# Patient Record
Sex: FEMALE | Race: WHITE | NOT HISPANIC OR LATINO | URBAN - METROPOLITAN AREA
[De-identification: names, ages, dates, MRNs, and addresses within clinical notes are randomized per-mention and may not be internally consistent; named-entity substitution may affect disease eponyms.]

---

## 2022-09-01 ENCOUNTER — HOSPITAL ENCOUNTER (OUTPATIENT)
Dept: BEHAVIORAL HEALTH | Facility: CLINIC | Age: 27
Discharge: HOME | End: 2022-09-01
Attending: PSYCHIATRY & NEUROLOGY
Payer: COMMERCIAL

## 2022-09-01 PROCEDURE — 999N000216 HC STATISTIC ADULT CD FACE TO FACE-NO CHRG: Mod: GT | Performed by: COUNSELOR

## 2022-09-01 NOTE — PROGRESS NOTES
[facility]    ADULT Mental Health Assessment Appointment Note    PATIENT'S NAME:  Saray Sun    PREFERRED NAME: Marianna  MRN: [id number]  YOB: 1995  Address:  [address] [place] [id number]  PREFERRED PHONE: [phone]  May we leave a referral related message: Yes  EMAIL: [email or web address]  DATE OF SERVICE: 22  START TIME:  800  END TIME: 830  SERVICE MODALITY:  Video Visit:      Provider verified identity through the following two step process.  Patient provided:  Patient  and Patient address    Telemedicine Visit: The patient's condition can be safely assessed and treated via synchronous audio and visual telemedicine encounter.      Reason for Telemedicine Visit: Patient has requested telehealth visit    Originating Site (Patient Location): Patient's home    Distant Site (Provider Location): Provider Remote Setting- Home Office    Consent:  The patient/guardian has verbally consented to: the potential risks and benefits of telemedicine (video visit) versus in person care; bill my insurance or make self-payment for services provided; and responsibility for payment of non-covered services.     Patient would like the video invitation sent by:  My Chart    Mode of Communication:  Video Conference via [employer]    As the provider I attest to compliance with applicable laws and regulations related to telemedicine.    Identifying Information:  Patient is a 27 year old, female.  Patient was referred for an assessment by self.  Patient attended the session alone. Patient identified their preferred language to be English. Patient reported they au not need the assistance of an  or other support involved in therapy.     The patient s risk to self and others was assessed in the risks and follow up section of this document.    Chief Complaint:  The reason for seeking services at this time is: The reason for seeking  "services at this time is: \"Me\".  The problem(s) began 8/4/2022. Patient endorses a history of depression and anxiety. She reports that she has not worked in eight months due to mental health systems. She has noticed an increase in severity of symptoms within the last month following a relationship break up.  Patient resides with mom who is encouraging patient to get psychiatric help.     Substance Use:  Do you  have a history of alcohol or illicit drug use?   Patient reports that she drinks alcohol, is not concerned about her use. Reports mom is someone concerned.   A problematic pattern of alcohol/drug use leading to clinically significant impairment or distress, as manifested by at least two of the following, occurring within a 12-month period: (1) Substance is often taken in larger amounts or over a longer period than was intended. (2) There is persistent desire or unsuccessful efforts to cut down or control use of the substance.  (3) A great deal of time is spent in activities necessary to obtain the substance, use the substance, or recover from its effects. (4) Craving, or a strong desire or urge to use the substance. (5) Recurrent use of the substance resulting in a failure to fulfill major role obligations at work, school, or home. (6) Continued use of the substance despite having persistent or recurrent social or interpersonal problems caused or exacerbated by the effects of its use. (7)  Important social, occupational, or recreational activities are given up or reduced because of the substance. (8) Recurrent use of the substance in which it is physically hazardous. (9) Use of the substance is continued despite knowledge of having a persistent or recurrent physical or psychological problem that is likely to have been cause or exacerbated by the substance.(10) Tolerance:  either a need for markedly increased amounts of the substance to achieve the desired effect or a markedly diminished effect with continued " use of the dame amount of the substance.  The patient does not currently identity positively with any of the 11 DSM-5 criteria for a diagnostic impression of having a substance use disorder. If problematic use begins/returns, patient should be seen for an updated PRABHJOT assessment to determine if they meet criteria for any level of PRABHJOT programming. There are not recommendations for structured treatment or community support programming at present. Diagnostic assessment for substance use disorder completed.     Significant Losses / Trauma / Abuse / Neglect Issues:   There are indications or report of significant loss, trauma, abuse or neglect issues related to: There are indications and client denies any losses, trauma, abuse, or neglect concerns.  Concerns for possible neglect are not present.      Medical History:  Patient reports the following medication history: No past medical history on file..      Saray Dino reports taking the following medications:  No current outpatient medications on file.     No current facility-administered medications for this encounter.     Current Mental Status Exam:   Appearance:  Disheveled  Eye Contact:  Good   Psychomotor:  Normal   Attitude / Demeanor: Cooperative  Friendly Pleasant  Speech Rate:  Normal/ Responsive  Volume:  Normal  volume  Language:  intact  Mood:   Normal  Affect:   Appropriate    Thought Content: Clear   Thought Process: Goal Directed  Logical     Associations:  No loosening of associations  Insight:   Good   Judgment:  Intact   Orientation:  All  Attention:  Good    Functional Status:  Patient reports the following functional impairments: relationship(s), self-care, social interactions and work / vocational responsibilities.       Clinical Impressions:  Major Depressive Disorder-  A) Recurrent episode(s) - symptoms have been present during the same 2-week period and represent a change from previous functioning 5 or more symptoms (required for diagnosis)   -  Depressed mood. Note: In children and adolescents, can be irritable mood.     - Diminished interest or pleasure in all, or almost all, activities.     - Change in sleep pattern.   - Fatigue or loss of energy.    - Feelings of worthlessness or inappropriate and excessive guilt.    - Diminished ability to think or concentrate, or indecisiveness.    - Recurrent thoughts of death (not just fear of dying), recurrent suicidal ideation without a specific plan, or a suicide attempt or a specific plan for committing suicide.   B) The symptoms cause clinically significant distress or impairment in social, occupational, or other important areas of functioning  C) The episode is not attributable to the physiological effects of a substance or to another medical condition  D) The occurrence of major depressive episode is not better explained by other thought / psychotic disorders  E) There has never been a manic episode or hypomanic episode    Generalized Anxiety Disorder-  A. Excessive anxiety and worry about a number of events or activities (such as work or school performance).   B. The person finds it difficult to control the worry.   - Restlessness or feeling keyed up or on edge.    - Being easily fatigued.    - Difficulty concentrating or mind going blank.    - Irritability.    - Muscle tension.    - Sleep disturbance (difficulty falling or staying asleep, or restless unsatisfying sleep).   D. The focus of the anxiety and worry is not confined to features of an Axis I disorder.  E. The anxiety, worry, or physical symptoms cause clinically significant distress or impairment in social, occupational, or other important areas of functioning.   F. The disturbance is not due to the direct physiological effects of a substance (e.g., a drug of abuse, a medication) or a general medical condition (e.g., hyperthyroidism) and does not occur exclusively during a Mood Disorder, a Psychotic Disorder, or a Pervasive Developmental  Disorder.     Therapeutic Interventions Provided: {Assessment and intervention included meeting with patient, and review of EPIC notes. Psychotherapy techniques utilized include risk and safety assessment, establishing rapport, active and empathic listening, and validation of feelings and experiences.     Recommendations/Plan:   Patient had been struggling for the last month to get out of bed, she reports that she has not been able to work for the last 8 months because of her mental health systems. She reports she has an intake scheduled for an IOP at noon today and just wanted to be set up with psychiatry. A full diagnostic assessment was not needed as she has one scheduled elsewhere  today. Encouraged patient to consider [facility] IOP or PHP if she determines the other facility is not a good fit for her. Patient was given the number to intake for future scheduling.  Based on symptom reporting patient meets criteria for either a PHP or IOP.      Referrals:   Date: Thursday, 9/8/2022  Time: 1:00 pm - 2:00 pm  Provider: Marcela Maxwell DNP, CNP,PMBRITTNEYP,RN  Location: [facility], [address] [place] [address]  Phone: [phone]  Type: Telepsychiatry    Patient Instructions  For all appointments: you will be sent information to fill out the intake paperwork online via text or email. Please complete required paperwork at least 2 hours prior to your appointment. You will also be sent a link via email or text to attend the appointment remotely. You can contact the clinic at [phone], or visit the website at [email or web address]. Initial appointments are scheduled for 60 minutes, so allow for a full hour for the appointment.      Erin Osullivan MA,  LPCC, LADC  September 1, 2022  Licensed Psychotherapist  [facility]

## 2023-01-07 ENCOUNTER — HEALTH MAINTENANCE LETTER (OUTPATIENT)
Age: 28
End: 2023-01-07

## 2024-02-10 ENCOUNTER — HEALTH MAINTENANCE LETTER (OUTPATIENT)
Age: 29
End: 2024-02-10

## 2025-03-02 ENCOUNTER — HEALTH MAINTENANCE LETTER (OUTPATIENT)
Age: 30
End: 2025-03-02